# Patient Record
Sex: FEMALE | Race: WHITE | NOT HISPANIC OR LATINO | URBAN - METROPOLITAN AREA
[De-identification: names, ages, dates, MRNs, and addresses within clinical notes are randomized per-mention and may not be internally consistent; named-entity substitution may affect disease eponyms.]

---

## 2024-01-15 ENCOUNTER — APPOINTMENT (RX ONLY)
Dept: URBAN - METROPOLITAN AREA CLINIC 159 | Facility: CLINIC | Age: 49
Setting detail: DERMATOLOGY
End: 2024-01-15

## 2024-01-15 DIAGNOSIS — Z41.9 ENCOUNTER FOR PROCEDURE FOR PURPOSES OTHER THAN REMEDYING HEALTH STATE, UNSPECIFIED: ICD-10-CM

## 2024-01-15 PROCEDURE — ? DELUXE HYDRAFACIAL

## 2024-01-15 NOTE — PROCEDURE: DELUXE HYDRAFACIAL
Indication: dehydrated skin
Number Of Passes: 0
Tip Override
Tip: Hydropeel Tip, Orange Aggression
Tip: Hydropeel Tip, Teal
Procedure: Exfoliation
Procedure: Extend and Protect
Solution Override
Consent: Written consent obtained, risks reviewed including but not limited to crusting, scabbing, blistering, scarring, darker or lighter pigmentary change, bruising, and/or incomplete response.
Procedure: Extraction
Post-Care Instructions: I reviewed with the patient in detail post-care instructions. Patient should stay away from the sun and wear sun protection until treated areas are fully healed.
Tip: Hydropeel Tip, Clear
Location: face
Procedure: Boost
Procedure: Peel
Solution: Beta-HD
Solution: Activ-4
Solution: GlySal 7.5%
Procedure: Fusion
Price (Use Numbers Only, No Special Characters Or $): 278
Treatment Number: 1